# Patient Record
Sex: FEMALE | Race: ASIAN | URBAN - METROPOLITAN AREA
[De-identification: names, ages, dates, MRNs, and addresses within clinical notes are randomized per-mention and may not be internally consistent; named-entity substitution may affect disease eponyms.]

---

## 2023-02-22 ENCOUNTER — TELEHEALTH PROVIDED OTHER THAN IN PATIENT'S HOME (OUTPATIENT)
Dept: URBAN - METROPOLITAN AREA TELEHEALTH 12 | Facility: TELEHEALTH | Age: 53
End: 2023-02-22

## 2023-02-22 VITALS — WEIGHT: 130 LBS | HEIGHT: 62 IN

## 2023-02-22 DIAGNOSIS — R14.2 ERUCTATION: ICD-10-CM

## 2023-02-22 DIAGNOSIS — Z86.010 PERSONAL HISTORY OF COLONIC POLYPS: ICD-10-CM

## 2023-02-22 PROCEDURE — 99204 OFFICE O/P NEW MOD 45 MIN: CPT | Mod: 95 | Performed by: PHYSICIAN ASSISTANT

## 2023-02-22 NOTE — SERVICEHPINOTES
PATIENT VERIFIED BY DATE OF BIRTH AND NAME. Patient has been consented for this telecommunication visit. Pt reports that she had a colonoscopy about 2 yrs ago. She states that polyps were removed unclear when her recall is.   Pertinent negative symptoms include palpitation/fluttering of heart, pain, shortness of breath while exercising, constipation, diarrhea, pain, reflux (heartburn), rectal bleeding, night sweats, weight loss. She has a lot of burping--that is the only current UGI complaint at the moment.  
madeleine salvador
Sounds like she had a PFO closed about 20 yrs ago not entirely clear what heart surgery she had. Doesn't follow with a cardiologist. 
madeleine Oliveira wishes today are that I get her previous op/path report and review results to let her know when she is due for next procedures.

## 2024-04-09 ENCOUNTER — TELEHEALTH PROVIDED OTHER THAN IN PATIENT'S HOME (OUTPATIENT)
Dept: URBAN - METROPOLITAN AREA TELEHEALTH 12 | Facility: TELEHEALTH | Age: 54
End: 2024-04-09
Payer: COMMERCIAL

## 2024-04-09 VITALS — HEIGHT: 62 IN | WEIGHT: 136 LBS

## 2024-04-09 DIAGNOSIS — Z86.010 PERSONAL HISTORY OF COLONIC POLYPS: ICD-10-CM

## 2024-04-09 DIAGNOSIS — R14.0 ABDOMINAL DISTENSION (GASEOUS): ICD-10-CM

## 2024-04-09 DIAGNOSIS — Z80.0 FAMILY HISTORY OF MALIGNANT NEOPLASM OF DIGESTIVE ORGANS: ICD-10-CM

## 2024-04-09 PROCEDURE — 99213 OFFICE O/P EST LOW 20 MIN: CPT | Mod: 95 | Performed by: PHYSICIAN ASSISTANT

## 2024-04-09 NOTE — SERVICENOTES
Patient was located in their home during visit., I spent 20 minutes today with the patient for this telehealth visit., Patient's visit was conducted through [employer] video telecommunication. Patient consented before the start of visit as to understanding of privacy concerns, possible technological failure, and their responsibility of carrying out instructions of plan.

## 2024-04-09 NOTE — SERVICEHPINOTES
PATIENT VERIFIED BY DATE OF BIRTH AND NAME. Patient has been consented for this telecommunication visit using [employer] application.   Pt is here today to discuss abdominal bloating. Has felt bloating x 12 months at the most. She will seeing gynecology about this tomorrow. She is gaining weight. She is feeling more bloated postprandially. She is trying to eat more fruits and veggies--trying to eat less at night.  Portion size is not getting progressively smaller--no nausea, vomiting, dysphagia, change in bowel habits, constipation, diarrhea, or blood in the stool. Her   mom had CRC.  I received her previous colonoscopy op/path results--based upon colon report, she had a TA, due for next colonoscopy 06/2025. Pt had a completely different name on the previous reports so I did verify with her that this is her name but she has since legally changed it. Upon notes from previous GI, she had abdominal bloating then too. We did discuss possible GI causes of abdominal bloating like PUD, malignancy, etc--she wants to call back later if she decides to schedule the colonoscopy and EGD earlier than next yr. ROS as per HPI and o/w is unremarkable. No other GI related complaints today.

## 2025-07-22 ENCOUNTER — TELEHEALTH PROVIDED IN PATIENT'S HOME (OUTPATIENT)
Dept: URBAN - METROPOLITAN AREA TELEHEALTH 12 | Facility: TELEHEALTH | Age: 55
End: 2025-07-22
Payer: COMMERCIAL

## 2025-07-22 VITALS — WEIGHT: 140 LBS | HEIGHT: 62 IN

## 2025-07-22 DIAGNOSIS — R14.0 ABDOMINAL DISTENSION (GASEOUS): ICD-10-CM

## 2025-07-22 DIAGNOSIS — Z87.19 PERSONAL HISTORY OF OTHER DISEASES OF THE DIGESTIVE SYSTEM: ICD-10-CM

## 2025-07-22 DIAGNOSIS — R07.89 OTHER CHEST PAIN: ICD-10-CM

## 2025-07-22 DIAGNOSIS — Z86.0101 PERSONAL HISTORY OF ADENOMATOUS AND SERRATED COLON POLYPS: ICD-10-CM

## 2025-07-22 DIAGNOSIS — Z80.0 FAMILY HISTORY OF MALIGNANT NEOPLASM OF DIGESTIVE ORGANS: ICD-10-CM

## 2025-07-22 DIAGNOSIS — I25.10 ATHEROSCLEROTIC HEART DISEASE OF NATIVE CORONARY ARTERY WITH: ICD-10-CM

## 2025-07-22 PROCEDURE — 99214 OFFICE O/P EST MOD 30 MIN: CPT | Mod: 95 | Performed by: PHYSICIAN ASSISTANT

## 2025-07-22 NOTE — SERVICEHPINOTES
PATIENT VERIFIED BY DATE OF BIRTH AND NAME. Patient has been consented for this telecommunication visit using [employer] application.   Pt is here to discuss a colonoscopy--her last one 06/2020 showed TAs--given a 5 yr recall. Her mom also had CRC--in her 60s. Her father also had CRC---unclear of his age.    Pt notes that she eats a lot of veggies--no constipation or diarrhea. No blood in the stool, blood upon wiping. Pt has abdominal bloating--bloating is not linked to PO intake. No early satiety, weight loss, nausea, vomiting, abdominal pain. She has discussed bloating with gynecology--she states a pelvic u/s was found and it was unremarkable. Pt notes the bloating is chronic. She has mentioned it upon visit with me at least two prior times. Pt has high cholesterol--she was recommended to start a baby ASA and a statin (pt takes the ASA on an empty stomach). Pt was newly dx with CAD--medical management has been recommended w/a cardiac f/u in six months. [facility]. She can get chest pain at times with laying down at night. The chest pain can be burning in nature. No additional NSAID use. In 2020, she also had an EGD for abdominal bloating. Op report shows gastritis--we attempted to obtain the biopsies but we never received them. Given ongoing bloating, hx of gastritis, and now chest pain (likely non-cardiac as she sees a cardiologist as well)--will also update an EGD to assess for h pylori gastritis, celiac disease, etc. br
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 No problems with her lungs or anesthesia otherwise. 
br
br
No other GI related complaints today. ROS as per HPI and o/w is unremarkable.

## 2025-07-22 NOTE — SERVICENOTES
Patient was located in their home during visit., I spent 25 minutes today reviewing the chart, talking with the patient, reviewing previous results with patient, discussing plan, and documenting. Patient understands and agrees with plan. Questions/concerns addressed., Patient's visit was conducted through [employer] video telecommunication. Patient consented before the start of visit as to understanding of privacy concerns, possible technological failure, and their responsibility of carrying out instructions of plan.

## 2025-08-13 ENCOUNTER — OFFICE (OUTPATIENT)
Dept: URBAN - METROPOLITAN AREA PATHOLOGY 3 | Facility: PATHOLOGY | Age: 55
End: 2025-08-13
Payer: COMMERCIAL

## 2025-08-13 ENCOUNTER — AMBULATORY SURGICAL CENTER (OUTPATIENT)
Dept: URBAN - METROPOLITAN AREA SURGERY 23 | Facility: SURGERY | Age: 55
End: 2025-08-13
Payer: COMMERCIAL

## 2025-08-13 DIAGNOSIS — Z86.0101 PERSONAL HISTORY OF ADENOMATOUS AND SERRATED COLON POLYPS: ICD-10-CM

## 2025-08-13 DIAGNOSIS — Z09 ENCOUNTER FOR FOLLOW-UP EXAMINATION AFTER COMPLETED TREATMEN: ICD-10-CM

## 2025-08-13 DIAGNOSIS — K31.89 OTHER DISEASES OF STOMACH AND DUODENUM: ICD-10-CM

## 2025-08-13 DIAGNOSIS — R14.0 ABDOMINAL DISTENSION (GASEOUS): ICD-10-CM

## 2025-08-13 DIAGNOSIS — Z80.0 FAMILY HISTORY OF MALIGNANT NEOPLASM OF DIGESTIVE ORGANS: ICD-10-CM

## 2025-08-13 PROCEDURE — 45378 DIAGNOSTIC COLONOSCOPY: CPT | Performed by: INTERNAL MEDICINE

## 2025-08-13 PROCEDURE — 88313 SPECIAL STAINS GROUP 2: CPT | Performed by: PATHOLOGY

## 2025-08-13 PROCEDURE — 88305 TISSUE EXAM BY PATHOLOGIST: CPT | Performed by: PATHOLOGY

## 2025-08-13 PROCEDURE — 43239 EGD BIOPSY SINGLE/MULTIPLE: CPT | Performed by: INTERNAL MEDICINE

## 2025-08-13 PROCEDURE — 88312 SPECIAL STAINS GROUP 1: CPT | Performed by: PATHOLOGY
